# Patient Record
(demographics unavailable — no encounter records)

---

## 2024-10-21 NOTE — HISTORY OF PRESENT ILLNESS
[Gradual] : gradual [10] : 10 [Dull/Aching] : dull/aching [Tightness] : tightness [de-identified] :  10/21/2024: For Visco 3 #2 to the left knee   09/16/2024: Known left knee osteoarthritis did well with CSI in May.  Recently pain has returned and he is requesting repeat injection   05/13/2024: He is for evaluation 3 to 4-week history of left knee pain.  He states his symptoms are more problematic posterior in the knee.  He has tried over-the-counter anti-inflammatories without help.  He is known to have right knee OA and did well in the past with cortisone/HA injections [] : no [FreeTextEntry1] : left knee  [FreeTextEntry3] : few weeks  [FreeTextEntry5] : no injury, patient is feeling pain in the back of the knee  [FreeTextEntry7] : neck to shoulder

## 2024-10-21 NOTE — PROCEDURE
[Large Joint Injection] : Large joint injection [Left] : of the left [Knee] : knee [Pain] : pain [Inflammation] : inflammation [X-ray evidence of Osteoarthritis on this or prior visit] : x-ray evidence of Osteoarthritis on this or prior visit [Alcohol] : alcohol [Betadine] : betadine [Ethyl Chloride sprayed topically] : ethyl chloride sprayed topically [Sterile technique used] : sterile technique used [Visco-3 (25mg)] : 25mg of Visco-3 [] : Patient tolerated procedure well [Call if redness, pain or fever occur] : call if redness, pain or fever occur [Apply ice for 15min out of every hour for the next 12-24 hours as tolerated] : apply ice for 15 minutes out of every hour for the next 12-24 hours as tolerated [Patient was advised to rest the joint(s) for ____ days] : patient was advised to rest the joint(s) for [unfilled] days [Previous OTC use and PT nontherapeutic] : patient has tried OTC's including aspirin, Ibuprofen, Aleve, etc or prescription NSAIDS, and/or exercises at home and/or physical therapy without satisfactory response [Patient had decreased mobility in the joint] : patient had decreased mobility in the joint [Risks, benefits, alternatives discussed / Verbal consent obtained] : the risks benefits, and alternatives have been discussed, and verbal consent was obtained [#2] : series #2

## 2024-10-21 NOTE — PHYSICAL EXAM
[Left] : left knee [NL (0)] : extension 0 degrees [5___] : hamstring 5[unfilled]/5 [Equivocal] : equivocal Oz [] : negative Lachmann [TWNoteComboBox7] : flexion 120 degrees

## 2024-10-25 NOTE — HISTORY OF PRESENT ILLNESS
[8] : 8 [6] : 6 [Dull/Aching] : dull/aching [Tingling] : tingling [de-identified] : 62M p/w L hip pain. Has had it for many years. Has had both GT and IA hip injections which help. He notes occasional groin pain but is mostly having lateral hip pain today.  3/29/23 f/u L hip, lateral hip pain has returned, prevented him from walking a few weeks ago  1/10/24: f/up L hip. Having returning pain over the past week  10/25/2024: pt is here today for f/u on L hip, pt states he has a lot of pain within the area. lateral pain primarily and left lower back pain, pain severe after hiking on vacation [FreeTextEntry1] : L hip  [FreeTextEntry5] : pain is the same  [de-identified] : no

## 2024-10-25 NOTE — ASSESSMENT
[FreeTextEntry1] : 64M p/w L troch bursitis  L GT CSI tolerated well meloxicam for pain return 3 mo prn   The risks, benefits, contents and alternatives to injection were explained in full to the patient. Risks outlined include but are not limited to infection, sepsis, bleeding, scarring, skin discoloration, temporary increase in pain, syncopal episode, failure to resolve symptoms, allergic reaction, flare reaction, permanent white skin discoloration, symptom recurrence, and elevation of blood sugar in diabetics. Patient understood the risks. All questions were answered. After discussion of options, patient requested an injection. Oral informed consent was obtained and sterile prep was done of the injection site. Sterile technique was used to introduce the mixture. Patient tolerated the procedure well. Patient advised to ice the injection site this evening. Signs and symptoms of infection reviewed and patient advised to call immediately for redness, fevers, and/or chills.

## 2024-10-25 NOTE — PROCEDURE
[Large Joint Injection] : Large joint injection [Left] : of the left [Greater Trochanteric Bursa] : greater trochanteric bursa [Pain] : pain [Inflammation] : inflammation [X-ray evidence of Osteoarthritis on this or prior visit] : x-ray evidence of Osteoarthritis on this or prior visit [Alcohol] : alcohol [Betadine] : betadine [Ethyl Chloride sprayed topically] : ethyl chloride sprayed topically [Sterile technique used] : sterile technique used [___ cc    0.25%] : Bupivacaine (Marcaine) ~Vcc of 0.25%  [___ cc    40mg] : Triamcinolone (Kenalog) ~Vcc of 40 mg  [Call if redness, pain or fever occur] : call if redness, pain or fever occur [Apply ice for 15min out of every hour for the next 12-24 hours as tolerated] : apply ice for 15 minutes out of every hour for the next 12-24 hours as tolerated [Previous OTC use and PT nontherapeutic] : patient has tried OTC's including aspirin, Ibuprofen, Aleve, etc or prescription NSAIDS, and/or exercises at home and/or physical therapy without satisfactory response [Patient had decreased mobility in the joint] : patient had decreased mobility in the joint [Risks, benefits, alternatives discussed / Verbal consent obtained] : the risks benefits, and alternatives have been discussed, and verbal consent was obtained

## 2024-10-25 NOTE — PHYSICAL EXAM
[Left] : left hip [2+] : dorsalis pedis pulse: 2+ [AP] : anteroposterior [Lateral] : lateral [Mild arthritis (Tonnis Grade 1)] : Mild arthritis (Tonnis Grade 1) [] : non-antalgic

## 2024-10-28 NOTE — HISTORY OF PRESENT ILLNESS
[Gradual] : gradual [10] : 10 [Dull/Aching] : dull/aching [Tightness] : tightness [de-identified] : 10/28/2024: for Visco 3 #3 to left knee   10/21/2024: For Visco 3 #2 to the left knee   09/16/2024: Known left knee osteoarthritis did well with CSI in May.  Recently pain has returned and he is requesting repeat injection   05/13/2024: He is for evaluation 3 to 4-week history of left knee pain.  He states his symptoms are more problematic posterior in the knee.  He has tried over-the-counter anti-inflammatories without help.  He is known to have right knee OA and did well in the past with cortisone/HA injections [] : no [FreeTextEntry1] : left knee  [FreeTextEntry3] : few weeks  [FreeTextEntry5] : no injury, patient is feeling pain in the back of the knee  [FreeTextEntry7] : neck to shoulder

## 2024-10-28 NOTE — PROCEDURE
[Large Joint Injection] : Large joint injection [Left] : of the left [Knee] : knee [Pain] : pain [Inflammation] : inflammation [Alcohol] : alcohol [Betadine] : betadine [Ethyl Chloride sprayed topically] : ethyl chloride sprayed topically [Sterile technique used] : sterile technique used [Visco-3 (25mg)] : 25mg of Visco-3 [#3] : series #3 [] : Patient tolerated procedure well [Call if redness, pain or fever occur] : call if redness, pain or fever occur [Apply ice for 15min out of every hour for the next 12-24 hours as tolerated] : apply ice for 15 minutes out of every hour for the next 12-24 hours as tolerated [Patient was advised to rest the joint(s) for ____ days] : patient was advised to rest the joint(s) for [unfilled] days [Previous OTC use and PT nontherapeutic] : patient has tried OTC's including aspirin, Ibuprofen, Aleve, etc or prescription NSAIDS, and/or exercises at home and/or physical therapy without satisfactory response [Patient had decreased mobility in the joint] : patient had decreased mobility in the joint [Risks, benefits, alternatives discussed / Verbal consent obtained] : the risks benefits, and alternatives have been discussed, and verbal consent was obtained

## 2024-11-06 NOTE — ASSESSMENT
[FreeTextEntry1] : 64M p/w L troch bursitis, lumbar radic  MRI L spine, multilevel ddd and stenosis meloxicam for pain f/u pain mgmt re possible senait   The risks, benefits, contents and alternatives to injection were explained in full to the patient. Risks outlined include but are not limited to infection, sepsis, bleeding, scarring, skin discoloration, temporary increase in pain, syncopal episode, failure to resolve symptoms, allergic reaction, flare reaction, permanent white skin discoloration, symptom recurrence, and elevation of blood sugar in diabetics. Patient understood the risks. All questions were answered. After discussion of options, patient requested an injection. Oral informed consent was obtained and sterile prep was done of the injection site. Sterile technique was used to introduce the mixture. Patient tolerated the procedure well. Patient advised to ice the injection site this evening. Signs and symptoms of infection reviewed and patient advised to call immediately for redness, fevers, and/or chills.

## 2024-11-06 NOTE — HISTORY OF PRESENT ILLNESS
[8] : 8 [6] : 6 [Dull/Aching] : dull/aching [Tingling] : tingling [de-identified] : 62M p/w L hip pain. Has had it for many years. Has had both GT and IA hip injections which help. He notes occasional groin pain but is mostly having lateral hip pain today.  3/29/23 f/u L hip, lateral hip pain has returned, prevented him from walking a few weeks ago  1/10/24: f/up L hip. Having returning pain over the past week  10/25/2024: pt is here today for f/u on L hip, pt states he has a lot of pain within the area. lateral pain primarily and left lower back pain, pain severe after hiking on vacation  11/06/24: pt is here today for MRI results of L hip, states he still has pain within the lumbar. [FreeTextEntry1] : L hip  [FreeTextEntry5] : pain is the same  [de-identified] : no

## 2024-12-05 NOTE — PHYSICAL EXAM
[Right] : right shoulder [5 ___] : forward flexion 5[unfilled]/5 [5___] : external rotation 5[unfilled]/5 [] : no erythema [FreeTextEntry9] : FE: 140 ER: 40

## 2024-12-05 NOTE — HISTORY OF PRESENT ILLNESS
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  He was being treated for the neck under  and now the shoulder is established as well.  He has pain anterior and deep, and down the arms.  He was previously treated for the shoulder and had hyaluronic acid injections.  He takes motrin.  He is OOW and considering disability.  [FreeTextEntry5] : pt is here for visco-3 inj#3 on his left shoulder

## 2024-12-05 NOTE — ASSESSMENT
[FreeTextEntry1] : R shoulder injury, Gh DJD, possible RCT.  Had C4/5/6 fusion on 10/18/23.  Bilateral Visco-3 series completed 12/12/23. Ibuprofen 800 mg sent.  R Gh injection today.  MRI R shoulder with sedation was submitted again.  He is OOW awaiting SS/disability RTO for review.   Procedure Note: Large Joint Injection was performed because of pain and inflammation, failure of conservative treatment.   Medications: Depo-Medrol: 1 cc, 80 mg. Lidocaine: 2 cc, 1%.  Marcaine: 2 cc, .25%.   Medication was injected in the right glenohumeral joint. Patient has tried OTC's including aspirin, Ibuprofen, Aleve etc or prescription NSAIDs, and/or exercises at home and/ or physical therapy without satisfactory response. The risks, benefits, and alternatives to cortisone injection were explained in full to the patient. Risks outlined include but are not limited to infection, sepsis, bleeding, scarring, skin discoloration, temporary increase in pain, syncopal episode, failure to resolve symptoms, allergic reaction, symptom recurrence, and elevation of blood sugar in diabetics. Patient understood the risks. All questions were answered. After discussion of options, patient requested an injection. Oral informed consent was obtained and sterile prep of the injection site was performed using alcohol. Sterile technique was utilized for the procedure including the preparation of the solutions used for the injection. Ethyl chloride spray was used topically.  Sterile technique used. Patient tolerated procedure well. Post Procedure Instructions: Patient was advised to call if redness, pain, or fever occur and apply ice for 15 min. out of every hour for the next 12-24 hours as tolerated. patient was advised to rest the joint(s) for 2 days.  Ultrasound Guidance was used for the following reasons: for Glenohumeral injection.  Ultrasound guided injection was performed of the shoulder, visualization of the needle and placement of injection was performed without complication.